# Patient Record
Sex: FEMALE | Race: WHITE
[De-identification: names, ages, dates, MRNs, and addresses within clinical notes are randomized per-mention and may not be internally consistent; named-entity substitution may affect disease eponyms.]

---

## 2020-07-27 ENCOUNTER — HOSPITAL ENCOUNTER (EMERGENCY)
Dept: HOSPITAL 72 - EMR | Age: 35
Discharge: HOME | End: 2020-07-27
Payer: COMMERCIAL

## 2020-07-27 VITALS — BODY MASS INDEX: 28.66 KG/M2 | HEIGHT: 65 IN | WEIGHT: 172 LBS

## 2020-07-27 VITALS — SYSTOLIC BLOOD PRESSURE: 150 MMHG | DIASTOLIC BLOOD PRESSURE: 88 MMHG
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
## 2020-07-27 DIAGNOSIS — T63.421A: Primary | ICD-10-CM

## 2020-07-27 PROCEDURE — 99282 EMERGENCY DEPT VISIT SF MDM: CPT

## 2020-07-27 NOTE — EMERGENCY ROOM REPORT
History of Present Illness


General


Chief Complaint:  Skin Rash/Abscess


Source:  Patient





Present Illness


HPI


This patient states that 2 days ago she noted a stinger in her right lower arm.

  She states that it looked like a bee stinger.  She had never been stung by a 

bee in the past.  She states over the past 2 days the redness and swelling of 

her arm has progressed and worsened.  She states it is also blistered and 

become very itchy.  She denies pain.  She states it is uncomfortable because it 

is swollen.  She denies recent illness.  She denies fever chills.  She denies 

nausea or vomiting.  She has no other complaints.


Allergies:  


Coded Allergies:  


     No Known Allergies (Unverified , 7/27/20)





COVID-19 Screening


Contact w/high risk pt:  No


Experienced COVID-19 symptoms?:  No


COVID-19 Testing performed PTA:  No





Patient History


Past Medical History:  none, see triage record


Social History:  Denies: smoking, alcohol use, drug use


Last Menstrual Period:  7/3/20


Pregnant Now:  No


Reviewed Nursing Documentation:  PMH: Agreed; PSxH: Agreed





Nursing Documentation-PMH


Past Medical History:  No Stated History





Review of Systems


All Other Systems:  negative except mentioned in HPI





Physical Exam





Vital Signs








  Date Time  Temp Pulse Resp B/P (MAP) Pulse Ox O2 Delivery O2 Flow Rate FiO2


 


7/27/20 11:02 98.8 86 17 158/95 (116) 98 Room Air  








Sp02 EP Interpretation:  reviewed, normal


General Appearance:  no apparent distress, alert, GCS 15, non-toxic


Head:  normocephalic, atraumatic


Eyes:  bilateral eye normal inspection


ENT:  hearing grossly normal, no angioedema, normal voice


Neck:  full range of motion, supple/symm/no masses


Respiratory:  no respiratory distress, no retraction, no accessory muscle use, 

speaking full sentences


Rectal:  deferred


Musculoskeletal:  back normal, normal range of motion, gait/station normal, non-

tender


Neurologic:  alert, motor strength/tone normal, oriented x3, sensory intact, 

responsive, speech normal


Psychiatric:  judgement/insight normal, memory normal, mood/affect normal, no 

suicidal/homicidal ideation


Skin:  other - R. arm with clear erythema and mild edema covering the R. medial 

and anterior forearm extending to the mid upper arm.  mild warmth.  Central 

puncture site with surrounding blistering.


Lymphatic:  no adenopathy





Medical Decision Making


Diagnostic Impression:  


 Primary Impression:  


 Hymenoptera sting


 Additional Impressions:  


 Hymenoptera reaction


 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


ER Course


This patient has a significant local allergic reaction to what is likely a Bee 

or a hymenoptera.  This appears to be all allergic and wheel-like.  There is 

also blistering.  This is almost covering the entire aspect of her medial and 

anterior arm.  Given the severity of the reaction, I will place the patient on 

both oral and topical steroids.  Patient was also instructed to take over-the-

counter Benadryl.  Patient is given close return precautions and follow-up 

instructions.





Last Vital Signs








  Date Time  Temp Pulse Resp B/P (MAP) Pulse Ox O2 Delivery O2 Flow Rate FiO2


 


7/27/20 11:06 98.7 88 17 150/88 98 Room Air  








Status:  improved


Disposition:  HOME, SELF-CARE


Condition:  Improved











Casandra Marlow DO Jul 27, 2020 11:42